# Patient Record
Sex: MALE | Race: WHITE | NOT HISPANIC OR LATINO
[De-identification: names, ages, dates, MRNs, and addresses within clinical notes are randomized per-mention and may not be internally consistent; named-entity substitution may affect disease eponyms.]

---

## 2021-06-21 ENCOUNTER — NON-APPOINTMENT (OUTPATIENT)
Age: 76
End: 2021-06-21

## 2021-06-25 ENCOUNTER — APPOINTMENT (OUTPATIENT)
Dept: GASTROENTEROLOGY | Facility: CLINIC | Age: 76
End: 2021-06-25
Payer: MEDICARE

## 2021-06-25 ENCOUNTER — OUTPATIENT (OUTPATIENT)
Dept: OUTPATIENT SERVICES | Facility: HOSPITAL | Age: 76
LOS: 1 days | End: 2021-06-25
Payer: MEDICARE

## 2021-06-25 ENCOUNTER — APPOINTMENT (OUTPATIENT)
Dept: ULTRASOUND IMAGING | Facility: CLINIC | Age: 76
End: 2021-06-25
Payer: COMMERCIAL

## 2021-06-25 VITALS
OXYGEN SATURATION: 97 % | SYSTOLIC BLOOD PRESSURE: 120 MMHG | BODY MASS INDEX: 33.46 KG/M2 | HEART RATE: 65 BPM | WEIGHT: 247 LBS | HEIGHT: 72 IN | DIASTOLIC BLOOD PRESSURE: 76 MMHG

## 2021-06-25 DIAGNOSIS — K76.0 FATTY (CHANGE OF) LIVER, NOT ELSEWHERE CLASSIFIED: ICD-10-CM

## 2021-06-25 PROBLEM — Z00.00 ENCOUNTER FOR PREVENTIVE HEALTH EXAMINATION: Status: ACTIVE | Noted: 2021-06-25

## 2021-06-25 PROCEDURE — 99072 ADDL SUPL MATRL&STAF TM PHE: CPT

## 2021-06-25 PROCEDURE — 99202 OFFICE O/P NEW SF 15 MIN: CPT

## 2021-06-25 PROCEDURE — 76700 US EXAM ABDOM COMPLETE: CPT | Mod: 26

## 2021-06-25 PROCEDURE — 76700 US EXAM ABDOM COMPLETE: CPT

## 2021-06-25 RX ORDER — ROSUVASTATIN CALCIUM 20 MG/1
20 TABLET, FILM COATED ORAL
Refills: 0 | Status: ACTIVE | COMMUNITY

## 2021-06-25 RX ORDER — TAMSULOSIN HYDROCHLORIDE 0.4 MG/1
0.4 CAPSULE ORAL
Refills: 0 | Status: ACTIVE | COMMUNITY

## 2021-06-25 RX ORDER — FINASTERIDE 5 MG/1
5 TABLET, FILM COATED ORAL
Refills: 0 | Status: ACTIVE | COMMUNITY

## 2021-06-25 RX ORDER — VALSARTAN 80 MG/1
80 TABLET, COATED ORAL
Refills: 0 | Status: ACTIVE | COMMUNITY

## 2021-06-25 NOTE — ASSESSMENT
[FreeTextEntry1] : Mr. Najera is a 75-year-old male with a history of hypertension and hyperlipidemia.  He notices a vague discomfort in the upper abdominal area usually positional in nature upon arising from bed.  It is not associated with any gastrointestinal function although he does notice some increased belching and increased lower abdominal gas.  He does have a history of fatty liver however his liver is not enlarged on today's exam.  On exercise and twisting the patient does reproduce the discomfort he is complaining of.  I feel that the discomfort is radicular in nature and the patient has seen a neurologist and will be going for imaging.  He is having mild dyspepsia and I decided to give him an over-the-counter potent proton pump inhibitor for 10 days to 2 weeks to see if this helps him with his belching although he more than likely has aerophagia.  He has significant loss of his core tone causing his abdominal wall musculature to be protuberant and also causing small hernias in the abdominal wall which are easily reducible.  The patient should do exercises to increase his core strength which I feel will help him with his general gastrointestinal health.  He was also told to start a high potency probiotic to help him with a lower intestinal gas.  Obviously decreasing caffeine would be helpful in this case.  I will get back to the patient with the result of a sonogram which was ordered to follow-up on his fatty liver.

## 2021-06-25 NOTE — PHYSICAL EXAM
[General Appearance - Alert] : alert [General Appearance - In No Acute Distress] : in no acute distress [General Appearance - Well Nourished] : well nourished [Respiration, Rhythm And Depth] : normal respiratory rhythm and effort [Auscultation Breath Sounds / Voice Sounds] : lungs were clear to auscultation bilaterally [Apical Impulse] : the apical impulse was normal [Heart Rate And Rhythm] : heart rate was normal and rhythm regular [Heart Sounds] : normal S1 and S2 [Bowel Sounds] : normal bowel sounds [Abdomen Soft] : soft [Abdomen Tenderness] : non-tender [] : no hepato-splenomegaly [FreeTextEntry1] : There is poor muscle tone in the anterior abdomen with bulging when the patient sits up or strains indicating divarication of the rectus muscles.  There is no tenderness on palpation nor is there hepatomegaly. [Abdomen Mass (___ Cm)] : no abdominal mass palpated

## 2021-06-25 NOTE — HISTORY OF PRESENT ILLNESS
[FreeTextEntry1] : I saw your patient See Najera in the office today.  The patient is a 75-year-old male who has a history of hyperlipidemia as well as hypertension.  He denies a history of coronary artery disease but does have mild shortness of breath due to deconditioning and weight gain.  Patient has been vaccinated against Covid.  The patient notices a vague discomfort in the upper abdominal area.  He arises from bed he notices the pain emanating from the back in a belt-like manner to the anterior abdomen.  This discomfort is not related to eating fasting or bowel movements and is only relieved with certain positions.  He denies a history of any numbness in his extremities.  His appetite is good with no dysphagia.  The patient also notices protuberance of his abdomen with loss of muscle tone and a bulging when he sits up.  His bowel movements are usually normal but he is noting increased intestinal gas as well as belching.  Patient has 6-8 servings of caffeine a day rarely has ethanol and does not smoke.  There has been no blood in the stool or on the toilet tissue.  The patient saw her neurologist several days ago for tremor and is undergoing MRI testing.

## 2021-06-25 NOTE — REVIEW OF SYSTEMS
[Shortness Of Breath] : shortness of breath [Vomiting] : no vomiting [Constipation] : no constipation [Diarrhea] : no diarrhea [Negative] : Cardiovascular [FreeTextEntry6] : Mild shortness of breath on exertion. [FreeTextEntry7] : The patient notices increased belching and lower abdominal gas.  Patient has a vague discomfort in the upper abdomen which appears to be positional in nature.  There is no regurgitation. [FreeTextEntry8] : 1 nocturnal urination.

## 2023-02-08 ENCOUNTER — NON-APPOINTMENT (OUTPATIENT)
Age: 78
End: 2023-02-08

## 2023-07-17 ENCOUNTER — NON-APPOINTMENT (OUTPATIENT)
Age: 78
End: 2023-07-17

## 2023-07-28 ENCOUNTER — APPOINTMENT (OUTPATIENT)
Dept: GASTROENTEROLOGY | Facility: CLINIC | Age: 78
End: 2023-07-28
Payer: MEDICARE

## 2023-07-28 ENCOUNTER — TRANSCRIPTION ENCOUNTER (OUTPATIENT)
Age: 78
End: 2023-07-28

## 2023-07-28 VITALS
HEART RATE: 53 BPM | WEIGHT: 233 LBS | HEIGHT: 71 IN | TEMPERATURE: 95.6 F | BODY MASS INDEX: 32.62 KG/M2 | DIASTOLIC BLOOD PRESSURE: 76 MMHG | OXYGEN SATURATION: 96 % | SYSTOLIC BLOOD PRESSURE: 134 MMHG

## 2023-07-28 DIAGNOSIS — F45.8 OTHER SOMATOFORM DISORDERS: ICD-10-CM

## 2023-07-28 DIAGNOSIS — M54.16 RADICULOPATHY, LUMBAR REGION: ICD-10-CM

## 2023-07-28 DIAGNOSIS — D64.9 ANEMIA, UNSPECIFIED: ICD-10-CM

## 2023-07-28 PROCEDURE — 99214 OFFICE O/P EST MOD 30 MIN: CPT

## 2023-07-28 RX ORDER — FUROSEMIDE 20 MG/1
20 TABLET ORAL
Refills: 0 | Status: ACTIVE | COMMUNITY

## 2023-07-28 RX ORDER — PNV NO.95/FERROUS FUM/FOLIC AC 28MG-0.8MG
TABLET ORAL
Refills: 0 | Status: ACTIVE | COMMUNITY

## 2023-07-28 RX ORDER — METFORMIN HYDROCHLORIDE 500 MG/5ML
500 SOLUTION ORAL
Refills: 0 | Status: ACTIVE | COMMUNITY

## 2023-07-28 RX ORDER — ASPIRIN 81 MG
81 TABLET,CHEWABLE ORAL
Refills: 0 | Status: ACTIVE | COMMUNITY

## 2023-07-28 RX ORDER — PANTOPRAZOLE SODIUM 40 MG/1
40 TABLET, DELAYED RELEASE ORAL
Refills: 0 | Status: ACTIVE | COMMUNITY

## 2023-07-28 NOTE — PHYSICAL EXAM
[Alert] : alert [No Acute Distress] : no acute distress [No Respiratory Distress] : no respiratory distress [Auscultation Breath Sounds / Voice Sounds] : lungs were clear to auscultation bilaterally [Heart Rate And Rhythm] : heart rate was normal and rhythm regular [Murmurs] : no murmurs [Bowel Sounds] : normal bowel sounds [No Masses] : no abdominal mass palpated [Abdomen Soft] : soft [] : no hepatosplenomegaly

## 2023-07-28 NOTE — HISTORY OF PRESENT ILLNESS
[FreeTextEntry1] : I saw patient See Najera in the office today.  The patient is a 77-year-old male who has a history of hypertension and hyperlipidemia.  He is also receiving treatment for type 2 diabetes and BPH.  The patient has been doing well until approximately 2 to 3 weeks ago when he was at a party and had a syncopal episode.  The patient was revived at the scene and transported to the hospital at which point in the bus he vomited coffee-ground material x2.  The patient was evaluated in and sent home stating that he had a normal hemoglobin.\par The patient saw his primary care doctor and had a CBC which revealed a hemoglobin of 6 g per.  The patient did have several episodes of melena.  Gene  takes an aspirin a day and had been drinking some ethanol leading up to this episode.  The patient drinks several servings of caffeine a day.  The patient was admitted to a different hospital and underwent an upper endoscopy.  Patient is not aware of the results but he did state that a biopsy was obtained.  This discharge the patient has been feeling well.  He is currently on a proton pump inhibitor as well as iron supplements due to discontinuing a baby aspirin on a daily basis.  It appears as if the patient had a self-limiting episode of atrial fibrillation during his brief hospitalization..  See has a chronic history of aerophagia.

## 2023-07-28 NOTE — ASSESSMENT
[FreeTextEntry1] : Mr. Najera is a 77-year-old male with a history of hypertension,  type 2 diabetes and BPH.  The patient provides a history of significant GI bleeding.  The fact that he had hematemesis clearly indicates that it was  an upper GI source.  He is currently asymptomatic on a proton pump inhibitor and iron.  We will be reaching out to the hospital to obtain the endoscopy report to determine as to whether there was a clear gastric source.  If not, the patient may require a capsule study.  See was told to call me immediately with any acute change in his gastrointestinal status.  The patient is currently not having any colonic symptoms.  The patient is having increased belching likely on the basis of an underlying hiatus hernia and aerophagia.

## 2023-07-28 NOTE — REVIEW OF SYSTEMS
[Fever] : no fever [Chills] : no chills [Feeling Tired] : feeling tired [Recent Weight Loss (___ Lbs)] : no recent weight loss [Chest Pain] : no chest pain [Palpitations] : no palpitations [SOB on Exertion] : no shortness of breath during exertion [As Noted in HPI] : as noted in HPI [Abdominal Pain] : no abdominal pain [Vomiting] : vomiting [Constipation] : no constipation [Diarrhea] : no diarrhea [Heartburn] : no heartburn [Melena (black stool)] : melena [Bleeding] : bleeding

## 2024-02-02 ENCOUNTER — APPOINTMENT (OUTPATIENT)
Dept: GASTROENTEROLOGY | Facility: CLINIC | Age: 79
End: 2024-02-02
Payer: MEDICARE

## 2024-02-02 VITALS
BODY MASS INDEX: 33.18 KG/M2 | SYSTOLIC BLOOD PRESSURE: 133 MMHG | HEART RATE: 67 BPM | WEIGHT: 237 LBS | HEIGHT: 71 IN | DIASTOLIC BLOOD PRESSURE: 72 MMHG | OXYGEN SATURATION: 99 % | TEMPERATURE: 97.1 F

## 2024-02-02 DIAGNOSIS — E11.9 TYPE 2 DIABETES MELLITUS W/OUT COMPLICATIONS: ICD-10-CM

## 2024-02-02 DIAGNOSIS — K92.2 GASTROINTESTINAL HEMORRHAGE, UNSPECIFIED: ICD-10-CM

## 2024-02-02 DIAGNOSIS — R10.13 EPIGASTRIC PAIN: ICD-10-CM

## 2024-02-02 DIAGNOSIS — I10 ESSENTIAL (PRIMARY) HYPERTENSION: ICD-10-CM

## 2024-02-02 DIAGNOSIS — N40.0 BENIGN PROSTATIC HYPERPLASIA WITHOUT LOWER URINARY TRACT SYMPMS: ICD-10-CM

## 2024-02-02 PROCEDURE — 99214 OFFICE O/P EST MOD 30 MIN: CPT

## 2024-02-02 PROCEDURE — 82270 OCCULT BLOOD FECES: CPT

## 2024-02-02 NOTE — ASSESSMENT
[FreeTextEntry1] : The patient is a 78-year-old male with several medical issues including carotid artery disease, BPH,  type 2 diabetes and a remote history of GI bleeding.  The patient has been asymptomatic for the past 6 to 7 months and takes a proton pump inhibitor on a daily basis.  The patient's stool is currently guaiac negative.  Theoretically, the patient is currently not at a high risk for recurrence of bleeding since no chronic issues were found.  He takes an aspirin and more than likely will be adding an additional anticoagulant which would still put him at a higher risk.  It would be more favorable if the aspirin could be withheld, however this is up to the cardiologist.  The patient was told to call me immediately with any change in his stool consistency or color.  The patient will continue the proton pump inhibitor as part of prophylaxis.

## 2024-02-02 NOTE — REVIEW OF SYSTEMS
[Fever] : no fever [Chills] : no chills [Recent Weight Loss (___ Lbs)] : no recent weight loss [Chest Pain] : no chest pain [Palpitations] : no palpitations [SOB on Exertion] : no shortness of breath during exertion [Abdominal Pain] : no abdominal pain [Vomiting] : no vomiting [Constipation] : no constipation [Diarrhea] : no diarrhea [Heartburn] : no heartburn [Melena (black stool)] : no melena

## 2024-02-02 NOTE — HISTORY OF PRESENT ILLNESS
[FreeTextEntry1] : I saw patient See Najera in the office today.  The patient is a 78-year-old male who has a history of atrial fibrillation, carotid artery disease as well as BPH and type 2 diabetes.  Last August the patient had a significant episode of upper GI bleeding and was hospitalized.  Workup at that time revealed significant gastritis.  The patient currently takes a baby aspirin and is on a proton pump inhibitor.  See denies any current dyspeptic symptoms and his appetite is good with no dysphagia or unexplained weight loss.  Patient's bowel movements are currently normal with no melena or hematochezia.  The patient sees his urologist and cardiologist regularly.  The patient had an episode of bradycardia, and his beta-blocker has been suspended.  The cardiologist would like to put him on an additional anticoagulant in this case it is likely Eliquis.  See does not abuse caffeine, or ethanol. [de-identified] : The patient had an endoscopy done in the hospital last year and was found to have gastritis.

## 2024-02-02 NOTE — PHYSICAL EXAM
[Alert] : alert [No Acute Distress] : no acute distress [No Respiratory Distress] : no respiratory distress [Auscultation Breath Sounds / Voice Sounds] : lungs were clear to auscultation bilaterally [Heart Rate And Rhythm] : heart rate was normal and rhythm regular [Bowel Sounds] : normal bowel sounds [No Masses] : no abdominal mass palpated [Abdomen Soft] : soft [] : no hepatosplenomegaly [Normal Sphincter Tone] : normal sphincter tone [No External Hemorrhoid] : no external hemorrhoids [Occult Blood] : negative occult blood [de-identified] : The patient appears to be in sinus rhythm today. [de-identified] : There is formed brown stool in the rectal vault which is guaiac negative.